# Patient Record
Sex: MALE | Race: WHITE | NOT HISPANIC OR LATINO | URBAN - METROPOLITAN AREA
[De-identification: names, ages, dates, MRNs, and addresses within clinical notes are randomized per-mention and may not be internally consistent; named-entity substitution may affect disease eponyms.]

---

## 2018-04-10 ENCOUNTER — EMERGENCY (EMERGENCY)
Facility: HOSPITAL | Age: 58
LOS: 1 days | Discharge: HOME | End: 2018-04-10
Attending: EMERGENCY MEDICINE
Payer: COMMERCIAL

## 2018-04-10 VITALS
TEMPERATURE: 98 F | RESPIRATION RATE: 16 BRPM | DIASTOLIC BLOOD PRESSURE: 70 MMHG | HEART RATE: 75 BPM | OXYGEN SATURATION: 98 % | SYSTOLIC BLOOD PRESSURE: 120 MMHG

## 2018-04-10 VITALS
DIASTOLIC BLOOD PRESSURE: 77 MMHG | SYSTOLIC BLOOD PRESSURE: 124 MMHG | HEART RATE: 78 BPM | RESPIRATION RATE: 14 BRPM | OXYGEN SATURATION: 99 %

## 2018-04-10 LAB
ALBUMIN SERPL ELPH-MCNC: 4.5 G/DL — SIGNIFICANT CHANGE UP (ref 3.3–5)
ALP SERPL-CCNC: 61 U/L — SIGNIFICANT CHANGE UP (ref 40–120)
ALT FLD-CCNC: 17 U/L RC — SIGNIFICANT CHANGE UP (ref 10–45)
ANION GAP SERPL CALC-SCNC: 13 MMOL/L — SIGNIFICANT CHANGE UP (ref 5–17)
APTT BLD: 30.5 SEC — SIGNIFICANT CHANGE UP (ref 27.5–37.4)
AST SERPL-CCNC: 19 U/L — SIGNIFICANT CHANGE UP (ref 10–40)
BASOPHILS # BLD AUTO: 0.1 K/UL — SIGNIFICANT CHANGE UP (ref 0–0.2)
BASOPHILS NFR BLD AUTO: 0.8 % — SIGNIFICANT CHANGE UP (ref 0–2)
BILIRUB SERPL-MCNC: 0.7 MG/DL — SIGNIFICANT CHANGE UP (ref 0.2–1.2)
BUN SERPL-MCNC: 11 MG/DL — SIGNIFICANT CHANGE UP (ref 7–23)
CALCIUM SERPL-MCNC: 9.6 MG/DL — SIGNIFICANT CHANGE UP (ref 8.4–10.5)
CHLORIDE SERPL-SCNC: 104 MMOL/L — SIGNIFICANT CHANGE UP (ref 96–108)
CK MB CFR SERPL CALC: 1.2 NG/ML — SIGNIFICANT CHANGE UP (ref 0–6.7)
CK SERPL-CCNC: 64 U/L — SIGNIFICANT CHANGE UP (ref 30–200)
CO2 SERPL-SCNC: 25 MMOL/L — SIGNIFICANT CHANGE UP (ref 22–31)
CREAT SERPL-MCNC: 0.67 MG/DL — SIGNIFICANT CHANGE UP (ref 0.5–1.3)
D DIMER BLD IA.RAPID-MCNC: <150 NG/ML DDU — SIGNIFICANT CHANGE UP
EOSINOPHIL # BLD AUTO: 0.2 K/UL — SIGNIFICANT CHANGE UP (ref 0–0.5)
EOSINOPHIL NFR BLD AUTO: 2.6 % — SIGNIFICANT CHANGE UP (ref 0–6)
GLUCOSE SERPL-MCNC: 90 MG/DL — SIGNIFICANT CHANGE UP (ref 70–99)
HCT VFR BLD CALC: 48 % — SIGNIFICANT CHANGE UP (ref 39–50)
HGB BLD-MCNC: 17 G/DL — SIGNIFICANT CHANGE UP (ref 13–17)
INR BLD: 0.97 RATIO — SIGNIFICANT CHANGE UP (ref 0.88–1.16)
LIDOCAIN IGE QN: 40 U/L — SIGNIFICANT CHANGE UP (ref 7–60)
LYMPHOCYTES # BLD AUTO: 1.6 K/UL — SIGNIFICANT CHANGE UP (ref 1–3.3)
LYMPHOCYTES # BLD AUTO: 22.3 % — SIGNIFICANT CHANGE UP (ref 13–44)
MCHC RBC-ENTMCNC: 32.1 PG — SIGNIFICANT CHANGE UP (ref 27–34)
MCHC RBC-ENTMCNC: 35.4 GM/DL — SIGNIFICANT CHANGE UP (ref 32–36)
MCV RBC AUTO: 90.6 FL — SIGNIFICANT CHANGE UP (ref 80–100)
MONOCYTES # BLD AUTO: 0.8 K/UL — SIGNIFICANT CHANGE UP (ref 0–0.9)
MONOCYTES NFR BLD AUTO: 10.3 % — SIGNIFICANT CHANGE UP (ref 2–14)
NEUTROPHILS # BLD AUTO: 4.7 K/UL — SIGNIFICANT CHANGE UP (ref 1.8–7.4)
NEUTROPHILS NFR BLD AUTO: 63.9 % — SIGNIFICANT CHANGE UP (ref 43–77)
NT-PROBNP SERPL-SCNC: 154 PG/ML — SIGNIFICANT CHANGE UP (ref 0–300)
PLATELET # BLD AUTO: 274 K/UL — SIGNIFICANT CHANGE UP (ref 150–400)
POTASSIUM SERPL-MCNC: 3.9 MMOL/L — SIGNIFICANT CHANGE UP (ref 3.5–5.3)
POTASSIUM SERPL-SCNC: 3.9 MMOL/L — SIGNIFICANT CHANGE UP (ref 3.5–5.3)
PROT SERPL-MCNC: 8.4 G/DL — HIGH (ref 6–8.3)
PROTHROM AB SERPL-ACNC: 10.6 SEC — SIGNIFICANT CHANGE UP (ref 9.8–12.7)
RBC # BLD: 5.29 M/UL — SIGNIFICANT CHANGE UP (ref 4.2–5.8)
RBC # FLD: 11.3 % — SIGNIFICANT CHANGE UP (ref 10.3–14.5)
SODIUM SERPL-SCNC: 142 MMOL/L — SIGNIFICANT CHANGE UP (ref 135–145)
TROPONIN T SERPL-MCNC: <0.01 NG/ML — SIGNIFICANT CHANGE UP (ref 0–0.06)
TROPONIN T SERPL-MCNC: <0.01 NG/ML — SIGNIFICANT CHANGE UP (ref 0–0.06)
WBC # BLD: 7.3 K/UL — SIGNIFICANT CHANGE UP (ref 3.8–10.5)
WBC # FLD AUTO: 7.3 K/UL — SIGNIFICANT CHANGE UP (ref 3.8–10.5)

## 2018-04-10 PROCEDURE — 93005 ELECTROCARDIOGRAM TRACING: CPT | Mod: 76

## 2018-04-10 PROCEDURE — 71046 X-RAY EXAM CHEST 2 VIEWS: CPT

## 2018-04-10 PROCEDURE — 82550 ASSAY OF CK (CPK): CPT

## 2018-04-10 PROCEDURE — 99220: CPT

## 2018-04-10 PROCEDURE — 83690 ASSAY OF LIPASE: CPT

## 2018-04-10 PROCEDURE — 84484 ASSAY OF TROPONIN QUANT: CPT

## 2018-04-10 PROCEDURE — 85027 COMPLETE CBC AUTOMATED: CPT

## 2018-04-10 PROCEDURE — 96375 TX/PRO/DX INJ NEW DRUG ADDON: CPT | Mod: 76,XU

## 2018-04-10 PROCEDURE — 83880 ASSAY OF NATRIURETIC PEPTIDE: CPT

## 2018-04-10 PROCEDURE — 82553 CREATINE MB FRACTION: CPT

## 2018-04-10 PROCEDURE — 85610 PROTHROMBIN TIME: CPT

## 2018-04-10 PROCEDURE — 85379 FIBRIN DEGRADATION QUANT: CPT

## 2018-04-10 PROCEDURE — 80053 COMPREHEN METABOLIC PANEL: CPT

## 2018-04-10 PROCEDURE — 93010 ELECTROCARDIOGRAM REPORT: CPT

## 2018-04-10 PROCEDURE — 75574 CT ANGIO HRT W/3D IMAGE: CPT | Mod: 26

## 2018-04-10 PROCEDURE — 71046 X-RAY EXAM CHEST 2 VIEWS: CPT | Mod: 26

## 2018-04-10 PROCEDURE — 99284 EMERGENCY DEPT VISIT MOD MDM: CPT | Mod: 25

## 2018-04-10 PROCEDURE — 75574 CT ANGIO HRT W/3D IMAGE: CPT

## 2018-04-10 PROCEDURE — 85730 THROMBOPLASTIN TIME PARTIAL: CPT

## 2018-04-10 PROCEDURE — G0378: CPT

## 2018-04-10 RX ORDER — METOPROLOL TARTRATE 50 MG
100 TABLET ORAL ONCE
Qty: 0 | Refills: 0 | Status: COMPLETED | OUTPATIENT
Start: 2018-04-10 | End: 2018-04-10

## 2018-04-10 RX ORDER — SIMVASTATIN 20 MG/1
1 TABLET, FILM COATED ORAL
Qty: 30 | Refills: 0 | OUTPATIENT
Start: 2018-04-10 | End: 2018-05-09

## 2018-04-10 RX ORDER — ASPIRIN/CALCIUM CARB/MAGNESIUM 324 MG
324 TABLET ORAL ONCE
Qty: 0 | Refills: 0 | Status: COMPLETED | OUTPATIENT
Start: 2018-04-10 | End: 2018-04-10

## 2018-04-10 RX ORDER — SODIUM CHLORIDE 9 MG/ML
3 INJECTION INTRAMUSCULAR; INTRAVENOUS; SUBCUTANEOUS EVERY 8 HOURS
Qty: 0 | Refills: 0 | Status: DISCONTINUED | OUTPATIENT
Start: 2018-04-10 | End: 2018-04-14

## 2018-04-10 RX ORDER — SODIUM CHLORIDE 9 MG/ML
3 INJECTION INTRAMUSCULAR; INTRAVENOUS; SUBCUTANEOUS ONCE
Qty: 0 | Refills: 0 | Status: COMPLETED | OUTPATIENT
Start: 2018-04-10 | End: 2018-04-10

## 2018-04-10 RX ADMIN — Medication 324 MILLIGRAM(S): at 11:20

## 2018-04-10 RX ADMIN — SODIUM CHLORIDE 3 MILLILITER(S): 9 INJECTION INTRAMUSCULAR; INTRAVENOUS; SUBCUTANEOUS at 11:20

## 2018-04-10 RX ADMIN — Medication 100 MILLIGRAM(S): at 11:20

## 2018-04-10 NOTE — ED PROVIDER NOTE - OBJECTIVE STATEMENT
58yo m with pmhx of HTN & current smoker (10cig daily)  BIBEMS for intermittent palpitations associated with SOB and chest discomfort x3days.  Patient states episodes last 10mintues and resolves on his own.  Describes CP as non radiating, nonexertional non pleuritic "similar to gas pain." Never had cardiac work up. patient denies family hx of cardiac. Denies recent travel.  reports hes able to walk miles without CP or SOB.  denies fever, chills, HA, dizziness, abdominal pain, N/V, cough, leg swelling

## 2018-04-10 NOTE — ED ADULT NURSE NOTE - CHPI ED SYMPTOMS NEG
no syncope/no chills/no dizziness/no back pain/no nausea/no fever/no cough/no diaphoresis/no chest pain/no shortness of breath/no vomiting

## 2018-04-10 NOTE — ED PROVIDER NOTE - MEDICAL DECISION MAKING DETAILS
56yo m c/o of palpitations assocaited with SOB and chest discomfort x3days   imp; r/o ACS HEART score =3-5 (moderate risk, will get CT coronaries)  CXR EKG, cardiac enzymes, ASA, fluids, reassess

## 2018-04-10 NOTE — ED PROVIDER NOTE - NS ED ROS FT
ROS: As noted in HPI, otherwise below --  Constitutional symptoms: Negative  Eyes: Negative  Ears, Nose, Mouth, Throat: Negative  Cardiovascular: +  Respiratory: +  Gastrointestinal: +  Genitourinary: Negative  Musculoskeletal: Negative  Integumentary: Negative  Neurological: Negative  Psychiatric: Negative  Endocrine: Negative  Allergic/Immunologic: Negative

## 2018-04-10 NOTE — ED PROVIDER NOTE - PHYSICAL EXAMINATION
Gen: well appearing, of stated age, no acute distress; Head: NC, AT; ENT: MMM, no uvular deviation; Neck: supple with full ROM; Chest: CTAB, no retractions, rate normal, appears to breath comfortable; Heart: RRR S1S2 No JVD No peripheral edema b/l pulses 2+ in arms and legs; Abd: Soft non-tender, no rebound or guarding, no masses, no armenta sign, no mcburney tenderness, no CVAT; Back: No spinal deformity; Ext: Moving all 4 limbs without obvious impairment to ROM, no obvious weakness; Neuro: fluid speech, no focal deficits, oriented to person, place, situation; Psych: No anxiety, depression or pressured speech noted; Skin: no utricaria, no diffuse rash. -ncohen

## 2018-04-10 NOTE — ED CDU PROVIDER INITIAL DAY NOTE - DETAILS
58y/o male with PMHx of HTN and current smoker presented to the ED for intermittent palpitations with associated SOB.  Chest Pain  -cardiac monitor  -continuous monitoring and frequent re-evals  -repeat cardiac enzymes   - CT coronary (metoprolol tartrate [id number] given in anticipation for CTC)  -Discussed case with ED attending Dr. Marcos Mijares

## 2018-04-10 NOTE — ED ADULT NURSE NOTE - OBJECTIVE STATEMENT
Pt is a 56 yo m who was brought to the ED via EMS from a clinic c/o intermittent palpitations  with SOB and chest pressure x 3 days.  States episodes last approx. 1 0mintues and self resolves. Pressure is non radiating, feels like "gas pain." denies fever/chills, HA, dizziness/lightheadedness/abdominal pain, N/V, diaphoresisi, cough, leg swelling.

## 2018-04-10 NOTE — ED CDU PROVIDER DISPOSITION NOTE - PLAN OF CARE
1. Recommend Aspirin 81mg over the counter daily until further evaluation.  Take all of your other medications as previously prescribed.   2. Follow up with your PMD and cardiologist or our cardiology clinic [phone] within 48-72 hours. Show copies of your reports given to you.   3. Worsening or continued chest pain, shortness of breath, weakness, return to ED. 1. Recommend  simvastatin for your cholesterol which was sent electronically to your pharmacy. Take all of your other medications as previously prescribed.   2. Follow up with your PMD and our cardiology clinic [phone] within 48-72 hours. Discussed with cardiologist an alternative to starting aspirin. Show copies of your reports given to you.   3. Worsening or continued chest pain, shortness of breath, weakness, return to ED. 1. Recommend simvastatin 40mg to be taken at bedtime for your cholesterol which was sent electronically to your pharmacy. Take all of your other medications as previously prescribed.   2. Follow up with your PMD and our cardiology clinic [phone] within 48-72 hours. Discuss with cardiologist an alternative to starting aspirin as it causes lower GI bleeding. Show copies of your reports given to you.   3. Worsening or continued chest pain, shortness of breath, weakness, return to ED.  4. Patient to follow up with a pulmonary clinic [phone] as patient has emphysema

## 2018-04-10 NOTE — ED CDU PROVIDER INITIAL DAY NOTE - OBJECTIVE STATEMENT
56y/o male with PMHx of HTN and current smoker presented to the ED for intermittent palpitations with associated SOB. Episodes last ten minutes and resolve spontaneously. Patient had non radiating well localized chest discomfort and was relieved after he belched when he felt bloated. Patient able to tolerate exercise without CP or SOB. Currently denied ever, chills, HA, dizziness, abdominal pain, N/V, cough, leg swelling, active CP, SOB at rest 58y/o male with PMHx of HTN and current smoker presented to the ED for intermittent palpitations with associated SOB. Episodes last ten minutes and resolve spontaneously. Patient had non radiating well localized chest discomfort and was relieved after he belched when he felt bloated. Patient able to tolerate exercise without CP or SOB. Currently denied ever, chills, HA, dizziness, abdominal pain, N/V, cough, leg swelling, active CP, SOB at rest    Ed course: Trop negx1, CK negative, CKMB negative, CXR neg, EKG showed no ischemic changes, CBC w/ diff WNL, CMP WNL. Patient given metoprolol tartrate 100mg POx1 in anticipation for CT coronary.

## 2018-04-10 NOTE — ED CDU PROVIDER DISPOSITION NOTE - CLINICAL COURSE
56y/o male with PMHx of HTN and current smoker presented to the ED for intermittent palpitations with associated SOB. Episodes last ten minutes and resolve spontaneously. Patient had non radiating well localized chest discomfort and was relieved after he belched when he felt bloated. Patient able to tolerate exercise without CP or SOB. Currently denied ever, chills, HA, dizziness, abdominal pain, N/V, cough, leg swelling, active CP, SOB at rest    ED course: Trop negx1, EKG showed no ischemic changes, CBC w/ diff WNL, CMP WNL. Patient given metoprolol tartrate 100mg POx1 in anticipation for CT coronary. Patient was brought to CDU while awaiting CT coronary. CT coronary showed_____ 58y/o male with PMHx of HTN and current smoker presented to the ED for intermittent palpitations with associated SOB. Episodes last ten minutes and resolve spontaneously. Patient had non radiating well localized chest discomfort and was relieved after he belched when he felt bloated. Patient able to tolerate exercise without CP or SOB. Currently denied ever, chills, HA, dizziness, abdominal pain, N/V, cough, leg swelling, active CP, SOB at rest    ED course: Trop negx2, CK negative, CKMB negative, CXR neg, EKG showed no ischemic changes, repeat EKG showed no changes, CBC w/ diff WNL, CMP WNL. Patient given metoprolol tartrate 100mg POx1 in anticipation for CT coronary. CT coronary showed_____. Patient symptoms stable upon discharge and ok per ED attending Dr. Colvin for discharge 56y/o male with PMHx of HTN and current smoker presented to the ED for intermittent palpitations with associated SOB. Episodes last ten minutes and resolve spontaneously. Patient had non radiating well localized chest discomfort and was relieved after he belched when he felt bloated. Patient able to tolerate exercise without CP or SOB. Currently denied ever, chills, HA, dizziness, abdominal pain, N/V, cough, leg swelling, active CP, SOB at rest    ED course: Trop negx2, CK negative, CKMB negative, CXR neg, EKG showed no ischemic changes, repeat EKG showed no changes, CBC w/ diff WNL, CMP WNL. Patient given metoprolol tartrate 100mg POx1 in anticipation for CT coronary. CT coronary showed nonobstructive disease with severe plaque formation and signs of emphysema. Started patient on simvastatin 40mg QHS daily. Recommended patient to cardiology clinic and discuss other options for AC as ASA causes GI bleeding. Recommended pulmonary clinic to patient. Patient symptoms stable upon discharge and ok per ED attending Dr. Colvin for discharge

## 2018-04-10 NOTE — ED CDU PROVIDER INITIAL DAY NOTE - ATTENDING CONTRIBUTION TO CARE
ATTENDING, Dyllan BEYER: I have personally performed a face to face diagnostic evaluation on this patient.  I have reviewed the ACP note and agree with the history, exam, and plan of care, except as noted here. Progress notes and further evaluation to be reviewed by observation and discharging attending.

## 2018-04-10 NOTE — ED ADULT NURSE REASSESSMENT NOTE - NS ED NURSE REASSESS COMMENT FT1
Pt report received from Dana IRIZARRY  . Pt transferred to cdu  Bed 8 for cardiac eval and testing. Pt a/ox3 denies respiratory distress, sob, dyspnea, C/P, palpitations, n/v/d at this time. Pt states symptoms have improved.  VSS, afebrile, IV clean/dry/intact. Pt aware of plan of care, call bell within reach ,safety maintained. Will monitor and assess.

## 2018-04-10 NOTE — ED CDU PROVIDER INITIAL DAY NOTE - PROGRESS NOTE DETAILS
Patient seen at bedside in NAD.  VSS.  Patient resting comfortably without complaints. Anticipating CT results. No events noted over telemetry GIOVANNY Anaya: Patient seen at bedside in NAD.  VSS.  Patient resting comfortably without complaints. Anticipating CT results. No events noted over telemetry Paged by CT coronary physician for prelim read on CTC in which he stated pt does not have obstructive disease but does a lot plaque within the arteries. Recommended pt to be on ASA, statin, smoking cessation and outpatient cardiology follow up. Made patient aware of findings at bedside and recommendations. Patient stated he was on aspirin in the past however was discontinued because it caused lower GI bleeding. Educated patient on smoking cessation and smoking adverse effects attributing to CAD. Patient understood recommendations GIOVANNY Anaya: Discussed case with ED attending Dr. Colvin who agreed with simvastatin 40mg QHS to be sent to pharmacy, cardiology, and pulmonary f/u. Patient safe to be discharged home

## 2018-04-10 NOTE — ED CDU PROVIDER DISPOSITION NOTE - ATTENDING CONTRIBUTION TO CARE
Attending MD Colvin:   I personally have seen and examined this patient.  Physician assistant note reviewed and agree on plan of care and except where noted.  See below for details.     57M with PMH including HTN, +tobacco use presented to the ED with palpitations and associated SOB.  Reports that has not had these symptoms since yesterday. Denies chest pain, shortness of breath, palpitations. Denies abdominal pain, nausea, vomiting, diarrhea, blood in stools. Denies leg swelling.  On exam, NAD, head NCAT, FROM at neck, no tenderness to palpation or stepoffs along length of spine, lungs CTAB with good inspiratory effort, +S1S2, no m/r/g, abdomen soft with +BS, NT, ND, moving all extremities with 5/5 strength bilateral upper and lower extremities, no calf tenderness, swelling; A/P: 57M with palpitations and shortness of breath now resolved.  Patient evaluated and feeling improved.  No acute issues at  this time.  Lab and radiology tests reviewed with patient and family.  Reviewed CT coronary results and need for cards and pulm (emphysema) follow up.  Encouraged smoking cessation, not ready.  Explained given report of inability to take ASA will need to discuss with his cards what he can take.  Rx Simvastatin.  Patient stable for discharge. Follow up instructions given, importance of follow up emphasized, return to ED parameters reviewed and patient verbalized understanding.  All questions answered, all concerns addressed.

## 2018-04-10 NOTE — ED PROVIDER NOTE - ATTENDING CONTRIBUTION TO CARE
57 male cp / sob associated w belching, 3 days, intermittent, 10 min per episode,  now asymptomatic. clear lungs. rrr. will eval for pe w d dimer, do not suspect dissection. will get ct coronary due to risk factors. pt has gi - advised to f/u very soon if cardiac w/u neg for further eval

## 2018-09-29 NOTE — ED CDU PROVIDER INITIAL DAY NOTE - NS ED ATTENDING STATEMENT MOD
No I have personally performed a face to face diagnostic evaluation on this patient. I have reviewed the ACP note and agree with the history, exam and plan of care, except as noted.
